# Patient Record
(demographics unavailable — no encounter records)

---

## 2024-11-25 NOTE — HISTORY OF PRESENT ILLNESS
[de-identified] : 11/25/24: pt is a 32 y/o male with lower back and right hip pain. pt states that he injured his rt hip from running. pain began about a year ago. pain has not improved. pain sometimes radiates into groin area. pt states no specific injury for his lower back pain. pain began about two weeks and has significantly worsened over the last two weeks. pain not radiating into legs. pt c/o of tingling in the lower part of his back. pt has tried applied both ice and heat. no hx of similar problems.

## 2024-11-25 NOTE — REASON FOR VISIT
[FreeTextEntry2] : This is a 31 year old M with lower back pain that started without injury in early November 2024.  No prior history.  No radiating pain or numbness.  No meds.  There is also R hip pain after running 30 miles one year ago.  He felt a bruise that never went away.  There is catching.

## 2024-11-25 NOTE — ASSESSMENT
[FreeTextEntry1] : We reviewed the findings and the history. Questions were answered and concerns addressed. The options were outlined. PT will begin. Hamstring stretching and core strengthening outlined. Arthrotec prescribed. Should R hip sx persist, an MRI will be ordered to r/o labral pathology.  Patient seen by Kirti SOLANO who determined the assessment and plan and participated in all aspects of the office encounter.

## 2024-11-25 NOTE — PHYSICAL EXAM
[Extension] : extension [Right] : right hip [NL (120)] : flexion 120 degrees [NL (30)] : extension 30 degrees [NL (35)] : adduction 35 degrees [NL (45)] : external rotation 45 degrees [5___] : adduction 5[unfilled]/5 [] : groin pain with resisted straight leg raise

## 2024-11-25 NOTE — IMAGING
[No bony abnormalities] : No bony abnormalities [AP] : anteroposterior [de-identified] : small acetabular overhang

## 2024-12-27 NOTE — IMAGING
[de-identified] : RIGHT HIP and THIGH Inspection: no swelling Palpation: groin tenderness Range of Motion: pain with internal rotation, full external rotation Strength: 5/5 Flexion, 5/5 Exenstion, 5/5 Abduction, 5/5 Adduction  Neurological: light touch is intact throughout Special Tests:  Impingement: positive Groin pain with IR: positive Jose: neg Pain in posterior thigh with leg lift and knee bent: neg

## 2024-12-27 NOTE — DISCUSSION/SUMMARY
[de-identified] : Right X-Ray Examination of the HIP with Pelvis 2-3 views: no fractures, subluxations or dislocations.   Assessment: The patient is a 39 year old female with physical exam findings consistent with [RIGHT HIP LEE ].   Patient and I discussed their symptoms. Discussed findings of today's exam and possible causes of patient's pain. Educated patient on their most probable diagnosis. Reviewed possible courses of treatment, and we collaboratively decided best course of treatment at this time will include:   1. Continue Physical Therapy. OTC anti-inflammatories as needed. 2. Will obtain MRA to al for labral tear     Follow up after MRA with Dr. Peres   Instructions: Dx / Natural History The patient was advised of the diagnosis.  The natural history of the pathology was explained in full to the patient in layman's terms.  Several different treatment options were discussed and explained in full to the patient including the risks and benefits of both surgical and non-surgical treatments.  All questions and concerns were answered.   RICE I explained to the patient that rest, ice, compression, and elevation would benefit them.  They may return to activity after follow-up or when they no longer have any pain.   NSAIDs - OTC Patient is to begin over the counter oral anti-inflammatory medications on an as needed basis, as long as there are no medical contraindications.  Patient is counseled on possible GI and blood pressure side effects.   Pain Guide Activities The patient was advised to let pain guide the gradual advancement of activities.   Icing The patient was advised to apply ice (wrapped in a towel or protective covering) to the area daily (20 minutes at a time, 2-4X/day).   All of the patient's questions were answered to Her satisfaction. Diagnoses and potential treatments were reviewed. She agreed with the plan and would like to move forward with it.

## 2024-12-27 NOTE — HISTORY OF PRESENT ILLNESS
[de-identified] : 12/27/2024: The patient is a 31 year old M, who presents today complaining of right hip. States back pain is better Date of Injury/Onset: 10/2023 Pain:    At Rest: 2/10 With Activity:  5/10 Mechanism of injury: the hip pain began after a long 30 miles This is not a Work Related Injury being treated under Worker's Compensation. This is not an athletic injury occurring associated with an interscholastic or organized sports team. Quality of symptoms: mostly aching pain anterior hip pain, instability, denies n/t  Improves with: rest Worse with: walking, running Prior treatment: MARY Henry - PT, XR, MRI (waiting on auth) Prior imaging: XR [facility]  Previous injury: None Out of work/sport: currently working  School/Sport/Position/Occupation: sales - running, gym  Additional Information: None

## 2025-01-21 NOTE — DISCUSSION/SUMMARY
[de-identified] : JAMIE PRATT has R hip impingement.  They have not yet failed non-operative treatment which includes:     1.  NSAIDs - these help to calm inflammation in your hip and can relieve pain.  They should be taken as directed and with food to help avoid an upset stomach.  Consult with your primary care physician if there is any concern over whether NSAIDs are compatible with your other medications or medical conditions.   2.  Physical Therapy - physical therapy helps to build up the muscles around your hip joint which helps to stabilize the joint and your pelvis and can relieve pain.  Physical therapy will also help to improve your hip mobility and strengthen your core muscles which help to stabilize your pelvis and subsequently your hip joint.   3.  Activity Modifications - if possible, avoiding activities and positions that cause hip pain can help to reduce inflammation in your hip joint and reduce hip pain   4.  Injections - occasionally a steroid injection into your hip joint can be used to help relieve pain.  I will see him back in [ ] for recheck. Their diagnosis was explained to him in terms of taking understanding and they are in agreement with this plan.  All of their questions were answered.     The patient's current medication management of their orthopedic diagnosis was reviewed today: (1) We discussed a comprehensive treatment plan that included possible pharmaceutical management involving the use of prescription strength medications including but not limited to options such as oral Naprosyn 500mg BID, once daily Meloxicam 15 mg, or 500-650 mg Tylenol versus over the counter oral medications and topical prescription NSAID Pennsaid vs over the counter Voltaren gel. (2) There is a moderate risk of morbidity with further treatment, especially from use of prescription strength medications and possible side effects of these medications which include upset stomach with oral medications, skin reactions to topical medications and cardiac/renal issues with long term use. (3) I recommended that the patient follow-up with their medical physician to discuss any significant specific potential issues with long term medication use such as interactions with current medications or with exacerbation of underlying medical comorbidities. (4) The benefits and risks associated with use of injectable, oral or topical, prescription and over the counter anti-inflammatory medications were discussed with the patient. The patient voiced understanding of the risks including but not limited to bleeding, stroke, kidney dysfunction, heart disease, and were referred to the black box warning label for further information.   Prior to appointment and during encounter with patient extensive medical records were reviewed including but not limited to, hospital records, out patient records, imaging results, and lab data. During this appointment the patient was examined, diagnoses were discussed and explained in a face to face manner. In addition extensive time was spent reviewing aforementioned diagnostic studies. Counseling including abnormal image results, differential diagnoses, treatment options, risk and benefits, lifestyle changes, current condition, and current medications was performed. Patient's comments, questions, and concerns were address and patient verbalized understanding.

## 2025-01-21 NOTE — HISTORY OF PRESENT ILLNESS
[de-identified] : 1/20/25: JAMIE PRATT is a 31 year old male here with right hip pain. Pain has been present for three months and is located laterally. Rates pain as /10 on pain scale. Referred by Dr. Mckeon.    Injury - hip pain began after a long 30 miles of running Mechanical symptoms - none Exacerbating factors/activities/positions - walking, running, prolonged sitting Pain during or after activity - both Back pain - no Radicular pain - no   Previous Treatment: seen by Dr. Mckeon NSAIDs: yes, but no relief PT: yes in the past, no relief Activity Mods: yes Surgery: no   Injections: no Numbing phase relief: [ ] Steroid phase relief: [ ]   Occupation: sales Sports/Recreational Activities: running

## 2025-01-21 NOTE — IMAGING
[de-identified] : General: NAD, A&Ox3 Gait: Non-antalgic, no Trendelenburg Foot Progression: neutral Knee Progression: neutral   R Hip Exam: Pain with Log Roll - negative Flexion: 110 Pain with Hyperflexion - yes FADIR - pos LASHAUN - neg Extension: 20 Flexion Contracture - none Prone Apprehension Test - neg Prone Rotation Test - symmetric Ischial tenderness - none Trochanteric tenderness - none   Abduction - 4 /5, pain - yes Adduction - 5 /5 Supine resisted SLR - 5 /5, pain - no Seated resisted hip flexion - 5 /5, pain - no Dorsiflexion 5/5 Plantarflexion 5/5 EHL 5/5 DP/PT 2+, foot is warm and well perfused        Leg Lengths: symmetric

## 2025-01-21 NOTE — DATA REVIEWED
[Outside X-rays] : outside x-rays [MRI] : MRI [Right] : of the right [Hip] : hip [I independently reviewed and interpreted images and report] : I independently reviewed and interpreted images and report [FreeTextEntry1] : cam with alpha >50 [FreeTextEntry2] : ant/sup labral tear, maintained articular cartilage

## 2025-03-06 NOTE — HISTORY OF PRESENT ILLNESS
[de-identified] : 1/20/25: JAMIE PRATT is a 31 year old male here with right hip pain. Pain has been present for three months and is located laterally. Rates pain as /10 on pain scale. Referred by Dr. Mckeon.  Injury - hip pain began after a long 30 miles of running Mechanical symptoms - none Exacerbating factors/activities/positions - walking, running, prolonged sitting Pain during or after activity - both Back pain - no Radicular pain - no Previous Treatment: seen by Dr. 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 States his hip is feeling the same since his last visit. Experiencing an aching pain laterally, taking Tylenol and Ibuprofen if needed. Patient is attending PT 1x a week.

## 2025-03-06 NOTE — IMAGING
[de-identified] : General: NAD, A&Ox3 Gait: Non-antalgic, no Trendelenburg Foot Progression: neutral Knee Progression: neutral   R Hip Exam: Pain with Log Roll - negative Flexion: 110 Pain with Hyperflexion - yes FADIR - pos LASHAUN - neg Extension: 20 Flexion Contracture - none Prone Apprehension Test - neg Prone Rotation Test - symmetric Ischial tenderness - none Trochanteric tenderness - none   Abduction - 4 /5, pain - yes Adduction - 5 /5 Supine resisted SLR - 5 /5, pain - no Seated resisted hip flexion - 5 /5, pain - no Dorsiflexion 5/5 Plantarflexion 5/5 EHL 5/5 DP/PT 2+, foot is warm and well perfused        Leg Lengths: symmetric

## 2025-03-13 NOTE — DISCUSSION/SUMMARY
[de-identified] : Yonis has left hip impingement.  He is running out of physical therapy sessions so I counseled him on a home exercise program and he will use his uncle's physical therapy gym to help with prehab before surgery on his right side.  We discussed the possible steroid injection 2 to 3 weeks ahead of his right hip arthroscopy to help with pain on his left side which will make his recovery easier.  All of his questions were answered he is in agreement with this plan.

## 2025-03-13 NOTE — IMAGING
[Left] : left hip with pelvis [There are no fractures, subluxations or dislocations. No significant abnormalities are seen] : There are no fractures, subluxations or dislocations. No significant abnormalities are seen [Femoral CAM lesion with Alpha angle greater than 50] : Femoral CAM lesion with Alpha angle greater than 50 [de-identified] : General: NAD, A&Ox3 Gait: Non-antalgic, no Trendelenburg Foot Progression: neutral Knee Progression: neutral   L Hip Exam: Pain with Log Roll - negative Flexion: 110 Pain with Hyperflexion - yes FADIR - pos LASHAUN - neg Extension: 20 Flexion Contracture - none Prone Apprehension Test - neg Prone Rotation Test - symmetric Ischial tenderness - none Trochanteric tenderness - none   Abduction - 4 /5, pain - yes Adduction - 5 /5 Supine resisted SLR - 5 /5, pain - no Seated resisted hip flexion - 5 /5, pain - no Dorsiflexion 5/5 Plantarflexion 5/5 EHL 5/5 DP/PT 2+, foot is warm and well perfused        Leg Lengths: symmetric

## 2025-04-07 NOTE — RESULTS/DATA
[] : results reviewed [de-identified] : pending [de-identified] : pending [de-identified] : Sinus bradycardia-non-acute [de-identified] : [id number]

## 2025-04-07 NOTE — HISTORY OF PRESENT ILLNESS
[No Adverse Anesthesia Reaction] : no adverse anesthesia reaction in self or family member [(Patient denies any chest pain, claudication, dyspnea on exertion, orthopnea, palpitations or syncope)] : Patient denies any chest pain, claudication, dyspnea on exertion, orthopnea, palpitations or syncope [Good (7-10 METs)] : Good (7-10 METs) [Aortic Stenosis] : no aortic stenosis [Atrial Fibrillation] : no atrial fibrillation [Coronary Artery Disease] : no coronary artery disease [Recent Myocardial Infarction] : no recent myocardial infarction [Implantable Device/Pacemaker] : no implantable device/pacemaker [Asthma] : no asthma [COPD] : no COPD [Sleep Apnea] : no sleep apnea [Smoker] : not a smoker [Chronic Anticoagulation] : no chronic anticoagulation [Chronic Kidney Disease] : no chronic kidney disease [Diabetes] : no diabetes [FreeTextEntry1] : Right Hip Arthroscopy, Labral Repair [FreeTextEntry2] : 04/11/2025 [FreeTextEntry3] : Dr. RADHAMES Peres [FreeTextEntry4] : Hx of SVT-ablation at age 13-14-had seen Pediatric Cardiologist. Had to have 2 procedures.  No recent cardiac issues.   Hx of [place] tooth extraction-no problems with anesthesia.    Runs for exercise-runs long distances.  Right hip injury for 1.5 years.    Feeling well today.  [FreeTextEntry7] : Echo 2016

## 2025-04-07 NOTE — HISTORY OF PRESENT ILLNESS
[No Adverse Anesthesia Reaction] : no adverse anesthesia reaction in self or family member [(Patient denies any chest pain, claudication, dyspnea on exertion, orthopnea, palpitations or syncope)] : Patient denies any chest pain, claudication, dyspnea on exertion, orthopnea, palpitations or syncope [Good (7-10 METs)] : Good (7-10 METs) [Aortic Stenosis] : no aortic stenosis [Atrial Fibrillation] : no atrial fibrillation [Coronary Artery Disease] : no coronary artery disease [Recent Myocardial Infarction] : no recent myocardial infarction [Implantable Device/Pacemaker] : no implantable device/pacemaker [Asthma] : no asthma [COPD] : no COPD [Sleep Apnea] : no sleep apnea [Smoker] : not a smoker [Chronic Anticoagulation] : no chronic anticoagulation [Chronic Kidney Disease] : no chronic kidney disease [Diabetes] : no diabetes [FreeTextEntry1] : Right Hip Arthroscopy, Labral Repair [FreeTextEntry2] : 04/11/2025 [FreeTextEntry3] : Dr. RADHAMES Peres [FreeTextEntry4] : Hx of SVT-ablation at age 13-14-had seen Pediatric Cardiologist. Had to have 2 procedures.  No recent cardiac issues.   Hx of [place] tooth extraction-no problems with anesthesia.    Runs for exercise-runs long distances.  Right hip injury for 1.5 years.    Feeling well today.  [FreeTextEntry7] : Echo 2016

## 2025-04-07 NOTE — RESULTS/DATA
[] : results reviewed [de-identified] : pending [de-identified] : pending [de-identified] : Sinus bradycardia-non-acute [de-identified] : [id number]

## 2025-04-24 NOTE — HISTORY OF PRESENT ILLNESS
[de-identified] : 1/20/25: JAMIE PRATT is a 31 year old male here with right hip pain. Pain has been present for three months and is located laterally. Rates pain as /10 on pain scale. Referred by Dr. Mckeon.  Injury - hip pain began after a long 30 miles of running Mechanical symptoms - none Exacerbating factors/activities/positions - walking, running, prolonged sitting Pain during or after activity - both Back pain - no Radicular pain - no Previous Treatment: seen by Dr. 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 States his hip is feeling the same since his last visit. Experiencing an aching pain laterally, taking Tylenol and Ibuprofen if needed. Patient is attending PT 1x a week.   3/13/25: ***NEW ISSUE** LEFT hip pain for 1-2 weeks. NKI, insidious, came on while walking at gym, burning feeling. Patient reports he is scheduled for RIGHT hip surgery on 6/4/25 and feels the left hip has similar symptoms, not as bad.   04/24/25: Patient here today for post-op #1 for right hip arthroscopy. DOS: 04/11/25. Experiencing soreness after PT, attending 3x a week. Comes into the office today ambulating with crutches and in his hip brace. Was taking Oxycodone and Meloxicam for his discomfort, would like a refill on the two medications.

## 2025-04-24 NOTE — PHYSICAL EXAM
[de-identified] : R Hip: Incision well healed, edges well approximated No erythema or drainage Numbness/paresthesias along anterolateral thigh - absent No calf tenderness Motor and sensation intact all distributions Foot WWP, DP2+

## 2025-04-24 NOTE — IMAGING
[Left] : left hip with pelvis [There are no fractures, subluxations or dislocations. No significant abnormalities are seen] : There are no fractures, subluxations or dislocations. No significant abnormalities are seen [Femoral CAM lesion with Alpha angle greater than 50] : Femoral CAM lesion with Alpha angle greater than 50 [de-identified] : General: NAD, A&Ox3 Gait: Non-antalgic, no Trendelenburg Foot Progression: neutral Knee Progression: neutral   L Hip Exam: Pain with Log Roll - negative Flexion: 110 Pain with Hyperflexion - yes FADIR - pos LASHAUN - neg Extension: 20 Flexion Contracture - none Prone Apprehension Test - neg Prone Rotation Test - symmetric Ischial tenderness - none Trochanteric tenderness - none   Abduction - 4 /5, pain - yes Adduction - 5 /5 Supine resisted SLR - 5 /5, pain - no Seated resisted hip flexion - 5 /5, pain - no Dorsiflexion 5/5 Plantarflexion 5/5 EHL 5/5 DP/PT 2+, foot is warm and well perfused        Leg Lengths: symmetric

## 2025-04-24 NOTE — DISCUSSION/SUMMARY
[de-identified] :  JAMIE PRATT is doing very well s/p R hip arthroscopy  Continue PT, rehab guidelines re-sent today Continue DVT ppx Continue HO prophylaxis 50% WB with crutches x2 weeks Sutures removed Wound care discussed - no submerging incision underwater for 6 weeks from date of surgery F/u in 4 weeks for recheck or sooner if questions/concerns arise All questions answered, in agreement with this plan

## 2025-05-05 NOTE — PHYSICAL EXAM
[de-identified] : R Hip: Incision well healed, edges well approximated No erythema or drainage Numbness/paresthesias along anterolateral thigh - absent No calf tenderness Motor and sensation intact all distributions Foot WWP, DP2+

## 2025-05-05 NOTE — IMAGING
[Left] : left hip with pelvis [There are no fractures, subluxations or dislocations. No significant abnormalities are seen] : There are no fractures, subluxations or dislocations. No significant abnormalities are seen [Femoral CAM lesion with Alpha angle greater than 50] : Femoral CAM lesion with Alpha angle greater than 50 [de-identified] : General: NAD, A&Ox3 Gait: Non-antalgic, no Trendelenburg Foot Progression: neutral Knee Progression: neutral   L Hip Exam: Pain with Log Roll - negative Flexion: 110 Pain with Hyperflexion - yes FADIR - pos LASHAUN - neg Extension: 20 Flexion Contracture - none Prone Apprehension Test - neg Prone Rotation Test - symmetric Ischial tenderness - none Trochanteric tenderness - none   Abduction - 4 /5, pain - yes Adduction - 5 /5 Supine resisted SLR - 5 /5, pain - no Seated resisted hip flexion - 5 /5, pain - no Dorsiflexion 5/5 Plantarflexion 5/5 EHL 5/5 DP/PT 2+, foot is warm and well perfused        Leg Lengths: symmetric

## 2025-05-05 NOTE — DISCUSSION/SUMMARY
[de-identified] :  JAMIE PRATT is doing very well s/p R hip arthroscopy  Continue PT Continue DVT ppx Continue HO prophylaxis Wean from crutches/brace Sutures removed Wound care discussed - no submerging incision underwater for 6 weeks from date of surgery F/u in 6 weeks for recheck or sooner if questions/concerns arise All questions answered, in agreement with this plan

## 2025-05-05 NOTE — HISTORY OF PRESENT ILLNESS
[de-identified] : 1/20/25: JAMIE PRATT is a 31 year old male here with right hip pain. Pain has been present for three months and is located laterally. Rates pain as /10 on pain scale. Referred by Dr. Mckeon.  Injury - hip pain began after a long 30 miles of running Mechanical symptoms - none Exacerbating factors/activities/positions - walking, running, prolonged sitting Pain during or after activity - both Back pain - no Radicular pain - no Previous Treatment: seen by Dr. 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 States his hip is feeling the same since his last visit. Experiencing an aching pain laterally, taking Tylenol and Ibuprofen if needed. Patient is attending PT 1x a week.   3/13/25: ***NEW ISSUE** LEFT hip pain for 1-2 weeks. NKI, insidious, came on while walking at gym, burning feeling. Patient reports he is scheduled for RIGHT hip surgery on 6/4/25 and feels the left hip has similar symptoms, not as bad.   04/24/25: Patient here today for post-op #1 for right hip arthroscopy. DOS: 04/11/25. Experiencing soreness after PT, attending 3x a week. Comes into the office today ambulating with crutches and in his hip brace. Was taking Oxycodone and Meloxicam for his discomfort, would like a refill on the two medications.   05/05/25: JAMIE is here today for RIGHT hip post op #2. denies pain today. attending therapy 3x/week. ambulating on crutches and in hip brace today.

## 2025-05-05 NOTE — PHYSICAL EXAM
[de-identified] : R Hip: Incision well healed, edges well approximated No erythema or drainage Numbness/paresthesias along anterolateral thigh - absent No calf tenderness Motor and sensation intact all distributions Foot WWP, DP2+

## 2025-05-05 NOTE — DISCUSSION/SUMMARY
[de-identified] :  JAMIE PRTAT is doing very well s/p R hip arthroscopy  Continue PT Continue DVT ppx Continue HO prophylaxis Wean from crutches/brace Sutures removed Wound care discussed - no submerging incision underwater for 6 weeks from date of surgery F/u in 6 weeks for recheck or sooner if questions/concerns arise All questions answered, in agreement with this plan

## 2025-05-05 NOTE — HISTORY OF PRESENT ILLNESS
[de-identified] : 1/20/25: JAMIE PRATT is a 31 year old male here with right hip pain. Pain has been present for three months and is located laterally. Rates pain as /10 on pain scale. Referred by Dr. Mckeon.  Injury - hip pain began after a long 30 miles of running Mechanical symptoms - none Exacerbating factors/activities/positions - walking, running, prolonged sitting Pain during or after activity - both Back pain - no Radicular pain - no Previous Treatment: seen by Dr. 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 States his hip is feeling the same since his last visit. Experiencing an aching pain laterally, taking Tylenol and Ibuprofen if needed. Patient is attending PT 1x a week.   3/13/25: ***NEW ISSUE** LEFT hip pain for 1-2 weeks. NKI, insidious, came on while walking at gym, burning feeling. Patient reports he is scheduled for RIGHT hip surgery on 6/4/25 and feels the left hip has similar symptoms, not as bad.   04/24/25: Patient here today for post-op #1 for right hip arthroscopy. DOS: 04/11/25. Experiencing soreness after PT, attending 3x a week. Comes into the office today ambulating with crutches and in his hip brace. Was taking Oxycodone and Meloxicam for his discomfort, would like a refill on the two medications.   05/05/25: JAMIE is here today for RIGHT hip post op #2. denies pain today. attending therapy 3x/week. ambulating on crutches and in hip brace today.

## 2025-05-05 NOTE — IMAGING
[Left] : left hip with pelvis [There are no fractures, subluxations or dislocations. No significant abnormalities are seen] : There are no fractures, subluxations or dislocations. No significant abnormalities are seen [Femoral CAM lesion with Alpha angle greater than 50] : Femoral CAM lesion with Alpha angle greater than 50 [de-identified] : General: NAD, A&Ox3 Gait: Non-antalgic, no Trendelenburg Foot Progression: neutral Knee Progression: neutral   L Hip Exam: Pain with Log Roll - negative Flexion: 110 Pain with Hyperflexion - yes FADIR - pos LASHAUN - neg Extension: 20 Flexion Contracture - none Prone Apprehension Test - neg Prone Rotation Test - symmetric Ischial tenderness - none Trochanteric tenderness - none   Abduction - 4 /5, pain - yes Adduction - 5 /5 Supine resisted SLR - 5 /5, pain - no Seated resisted hip flexion - 5 /5, pain - no Dorsiflexion 5/5 Plantarflexion 5/5 EHL 5/5 DP/PT 2+, foot is warm and well perfused        Leg Lengths: symmetric